# Patient Record
Sex: FEMALE | Race: OTHER | NOT HISPANIC OR LATINO | URBAN - METROPOLITAN AREA
[De-identification: names, ages, dates, MRNs, and addresses within clinical notes are randomized per-mention and may not be internally consistent; named-entity substitution may affect disease eponyms.]

---

## 2022-03-10 ENCOUNTER — HOSPITAL ENCOUNTER (OUTPATIENT)
Facility: CLINIC | Age: 22
Discharge: HOME | End: 2022-03-10
Attending: EMERGENCY MEDICINE
Payer: COMMERCIAL

## 2022-03-10 VITALS
OXYGEN SATURATION: 96 % | BODY MASS INDEX: 21.19 KG/M2 | WEIGHT: 135 LBS | RESPIRATION RATE: 16 BRPM | HEIGHT: 67 IN | DIASTOLIC BLOOD PRESSURE: 78 MMHG | HEART RATE: 103 BPM | TEMPERATURE: 97.5 F | SYSTOLIC BLOOD PRESSURE: 111 MMHG

## 2022-03-10 DIAGNOSIS — J02.9 TONSILLOPHARYNGITIS: Primary | ICD-10-CM

## 2022-03-10 LAB
EXPIRATION DATE: NORMAL
HETEROPH AB SER QL LA: NEGATIVE
Lab: NORMAL
POCT MANUFACTURER: NORMAL
S PYO AG THROAT QL: NEGATIVE

## 2022-03-10 PROCEDURE — 87880 STREP A ASSAY W/OPTIC: CPT | Performed by: EMERGENCY MEDICINE

## 2022-03-10 PROCEDURE — S9083 URGENT CARE CENTER GLOBAL: HCPCS | Performed by: EMERGENCY MEDICINE

## 2022-03-10 PROCEDURE — 86308 HETEROPHILE ANTIBODY SCREEN: CPT | Performed by: EMERGENCY MEDICINE

## 2022-03-10 PROCEDURE — 99213 OFFICE O/P EST LOW 20 MIN: CPT | Performed by: EMERGENCY MEDICINE

## 2022-03-10 RX ORDER — PENICILLIN V POTASSIUM 500 MG/1
500 TABLET, FILM COATED ORAL 2 TIMES DAILY
Qty: 20 TABLET | Refills: 0 | Status: SHIPPED | OUTPATIENT
Start: 2022-03-10 | End: 2022-03-20

## 2022-03-10 RX ORDER — NORGESTIMATE AND ETHINYL ESTRADIOL 0.25-0.035
1 KIT ORAL DAILY
COMMUNITY

## 2022-03-10 ASSESSMENT — ENCOUNTER SYMPTOMS
BACK PAIN: 0
VOICE CHANGE: 0
SHORTNESS OF BREATH: 0
CHILLS: 0
FEVER: 0
ARTHRALGIAS: 0
AGITATION: 0
PALPITATIONS: 0
COLOR CHANGE: 0
NAUSEA: 0
ABDOMINAL PAIN: 0
HEADACHES: 0
COUGH: 0
VOMITING: 0
BRUISES/BLEEDS EASILY: 0
DECREASED CONCENTRATION: 0
SORE THROAT: 1

## 2022-03-10 NOTE — DISCHARGE INSTRUCTIONS
Increase hydration rest  Alternate acetaminophen ibuprofen  Penicillin as directed  Review attached patient care instructions  Follow-up with family physician in the next 10 to 14 days if no improvement or worsening of symptoms

## 2022-03-10 NOTE — ED PROVIDER NOTES
History  Chief Complaint   Patient presents with   • Sore Throat     swollen tonsils, irritation, PND, afebrile symptom onset past Sunday     Patient with sore throat, swollen tonsils for the past 4 to 5 days.  Also complains of swollen lymph nodes in her neck, some malaise.  Denies fever, or been in touch with anyone else with similar illness.  Denies headache, rash, chest pain, trouble swallowing, sinus pressure, earache.  Rapid strep pending.                  Past Medical History:   Diagnosis Date   • Asthma        History reviewed. No pertinent surgical history.    History reviewed. No pertinent family history.    Social History     Tobacco Use   • Smoking status: Never Smoker   • Smokeless tobacco: Never Used   Vaping Use   • Vaping Use: Never used   Substance Use Topics   • Alcohol use: Never   • Drug use: Never       Review of Systems   Constitutional: Negative for chills and fever.   HENT: Positive for sore throat. Negative for congestion, ear pain, nosebleeds and voice change.    Respiratory: Negative for cough and shortness of breath.    Cardiovascular: Negative for chest pain and palpitations.   Gastrointestinal: Negative for abdominal pain, nausea and vomiting.   Musculoskeletal: Negative for arthralgias and back pain.   Skin: Negative for color change and rash.   Allergic/Immunologic: Negative for immunocompromised state.   Neurological: Negative for syncope and headaches.   Hematological: Does not bruise/bleed easily.   Psychiatric/Behavioral: Negative for agitation, behavioral problems and decreased concentration.       Physical Exam  ED Triage Vitals [03/10/22 1418]   Temp Heart Rate Resp BP SpO2   36.4 °C (97.5 °F) (!) 103 16 111/78 96 %      Temp Source Heart Rate Source Patient Position BP Location FiO2 (%) (Set)   Temporal Monitor Lying Right upper arm --       Physical Exam  Vitals and nursing note reviewed.   Constitutional:       General: She is not in acute distress.     Appearance: Normal  appearance. She is well-developed.   HENT:      Head: Normocephalic and atraumatic.      Right Ear: Tympanic membrane normal.      Left Ear: Tympanic membrane normal.      Nose: No congestion.      Mouth/Throat:      Pharynx: Oropharyngeal exudate and posterior oropharyngeal erythema present.      Comments: Bilateral tonsillar exudate, erythema and swelling.  Airway patent.  Eyes:      Conjunctiva/sclera: Conjunctivae normal.   Cardiovascular:      Rate and Rhythm: Normal rate and regular rhythm.   Pulmonary:      Effort: Pulmonary effort is normal. No respiratory distress.      Breath sounds: Normal breath sounds.   Abdominal:      Palpations: Abdomen is soft.      Tenderness: There is no abdominal tenderness.   Musculoskeletal:         General: No swelling or deformity.      Cervical back: Neck supple.   Lymphadenopathy:      Cervical: Cervical adenopathy present.   Skin:     General: Skin is warm and dry.      Coloration: Skin is not jaundiced.      Findings: No rash.   Neurological:      General: No focal deficit present.      Mental Status: She is alert.      Coordination: Coordination normal.      Gait: Gait normal.   Psychiatric:         Behavior: Behavior normal.         Thought Content: Thought content normal.           Procedures  Procedures    UC Course  Clinical Impressions as of 03/10/22 1507   Tonsillopharyngitis       MDM  Number of Diagnoses or Management Options  Tonsillopharyngitis: new and does not require workup  Diagnosis management comments: Mono and rapid strep negative but patient has enough Centor criteria to treat clinically for strep pharyngitis       Amount and/or Complexity of Data Reviewed  Clinical lab tests: ordered and reviewed    Risk of Complications, Morbidity, and/or Mortality  Presenting problems: moderate  Management options: moderate    Patient Progress  Patient progress: stable                 Manuel Cool DO  03/10/22 1512

## 2022-03-10 NOTE — ED NOTES
Bed: 01  Expected date:   Expected time:   Means of arrival: Car  Comments:     Avril Epps MA  03/10/22 2628

## 2022-03-12 ENCOUNTER — HOSPITAL ENCOUNTER (EMERGENCY)
Facility: HOSPITAL | Age: 22
Discharge: HOME | End: 2022-03-12
Attending: EMERGENCY MEDICINE
Payer: COMMERCIAL

## 2022-03-12 VITALS
RESPIRATION RATE: 14 BRPM | OXYGEN SATURATION: 99 % | SYSTOLIC BLOOD PRESSURE: 113 MMHG | WEIGHT: 135 LBS | TEMPERATURE: 98.3 F | BODY MASS INDEX: 21.14 KG/M2 | HEART RATE: 110 BPM | DIASTOLIC BLOOD PRESSURE: 73 MMHG

## 2022-03-12 DIAGNOSIS — J03.90 TONSILLITIS: Primary | ICD-10-CM

## 2022-03-12 LAB
ALBUMIN SERPL-MCNC: 4 G/DL (ref 3.4–5)
ALP SERPL-CCNC: 87 IU/L (ref 35–126)
ALT SERPL-CCNC: 17 IU/L (ref 11–54)
ANION GAP SERPL CALC-SCNC: 11 MEQ/L (ref 3–15)
AST SERPL-CCNC: 20 IU/L (ref 15–41)
BASOPHILS # BLD: 0.04 K/UL (ref 0.01–0.1)
BASOPHILS NFR BLD: 0.3 %
BILIRUB SERPL-MCNC: 0.4 MG/DL (ref 0.3–1.2)
BUN SERPL-MCNC: 7 MG/DL (ref 8–20)
CALCIUM SERPL-MCNC: 9.3 MG/DL (ref 8.9–10.3)
CHLORIDE SERPL-SCNC: 101 MEQ/L (ref 98–109)
CO2 SERPL-SCNC: 23 MEQ/L (ref 22–32)
CREAT SERPL-MCNC: 0.6 MG/DL (ref 0.6–1.1)
DIFFERENTIAL METHOD BLD: ABNORMAL
EOSINOPHIL # BLD: 0.01 K/UL (ref 0.04–0.36)
EOSINOPHIL NFR BLD: 0.1 %
ERYTHROCYTE [DISTWIDTH] IN BLOOD BY AUTOMATED COUNT: 13.2 % (ref 11.7–14.4)
GFR SERPL CREATININE-BSD FRML MDRD: >60 ML/MIN/1.73M*2
GLUCOSE SERPL-MCNC: 85 MG/DL (ref 70–99)
HCG UR QL: NEGATIVE
HCT VFR BLDCO AUTO: 41.9 % (ref 35–45)
HGB BLD-MCNC: 13.3 G/DL (ref 11.8–15.7)
IMM GRANULOCYTES # BLD AUTO: 0.04 K/UL (ref 0–0.08)
IMM GRANULOCYTES NFR BLD AUTO: 0.3 %
LYMPHOCYTES # BLD: 2.58 K/UL (ref 1.2–3.5)
LYMPHOCYTES NFR BLD: 21.9 %
MCH RBC QN AUTO: 26.8 PG (ref 28–33.2)
MCHC RBC AUTO-ENTMCNC: 31.7 G/DL (ref 32.2–35.5)
MCV RBC AUTO: 84.3 FL (ref 83–98)
MONOCYTES # BLD: 0.29 K/UL (ref 0.28–0.8)
MONOCYTES NFR BLD: 2.5 %
NEUTROPHILS # BLD: 8.81 K/UL (ref 1.7–7)
NEUTS SEG NFR BLD: 74.9 %
NRBC BLD-RTO: 0 %
PDW BLD AUTO: 10 FL (ref 9.4–12.3)
PLATELET # BLD AUTO: 294 K/UL (ref 150–369)
POTASSIUM SERPL-SCNC: 3.8 MEQ/L (ref 3.6–5.1)
PROT SERPL-MCNC: 8.4 G/DL (ref 6–8.2)
RBC # BLD AUTO: 4.97 M/UL (ref 3.93–5.22)
S PYO DNA THROAT QL NAA+PROBE: NOT DETECTED
SODIUM SERPL-SCNC: 135 MEQ/L (ref 136–144)
WBC # BLD AUTO: 11.77 K/UL (ref 3.8–10.5)

## 2022-03-12 PROCEDURE — 3E0337Z INTRODUCTION OF ELECTROLYTIC AND WATER BALANCE SUBSTANCE INTO PERIPHERAL VEIN, PERCUTANEOUS APPROACH: ICD-10-PCS | Performed by: EMERGENCY MEDICINE

## 2022-03-12 PROCEDURE — 3E0333Z INTRODUCTION OF ANTI-INFLAMMATORY INTO PERIPHERAL VEIN, PERCUTANEOUS APPROACH: ICD-10-PCS | Performed by: EMERGENCY MEDICINE

## 2022-03-12 PROCEDURE — 86308 HETEROPHILE ANTIBODY SCREEN: CPT | Performed by: PHYSICIAN ASSISTANT

## 2022-03-12 PROCEDURE — 85025 COMPLETE CBC W/AUTO DIFF WBC: CPT | Performed by: PHYSICIAN ASSISTANT

## 2022-03-12 PROCEDURE — 87651 STREP A DNA AMP PROBE: CPT | Performed by: PHYSICIAN ASSISTANT

## 2022-03-12 PROCEDURE — 36415 COLL VENOUS BLD VENIPUNCTURE: CPT | Performed by: PHYSICIAN ASSISTANT

## 2022-03-12 PROCEDURE — 25800000 HC PHARMACY IV SOLUTIONS: Performed by: PHYSICIAN ASSISTANT

## 2022-03-12 PROCEDURE — 63600000 HC DRUGS/DETAIL CODE: Performed by: PHYSICIAN ASSISTANT

## 2022-03-12 PROCEDURE — 96374 THER/PROPH/DIAG INJ IV PUSH: CPT

## 2022-03-12 PROCEDURE — 84703 CHORIONIC GONADOTROPIN ASSAY: CPT | Performed by: PHYSICIAN ASSISTANT

## 2022-03-12 PROCEDURE — 96361 HYDRATE IV INFUSION ADD-ON: CPT

## 2022-03-12 PROCEDURE — 99284 EMERGENCY DEPT VISIT MOD MDM: CPT | Mod: 25

## 2022-03-12 PROCEDURE — 86664 EPSTEIN-BARR NUCLEAR ANTIGEN: CPT | Performed by: PHYSICIAN ASSISTANT

## 2022-03-12 PROCEDURE — 80053 COMPREHEN METABOLIC PANEL: CPT | Performed by: PHYSICIAN ASSISTANT

## 2022-03-12 RX ORDER — DEXAMETHASONE SODIUM PHOSPHATE 4 MG/ML
10 INJECTION, SOLUTION INTRA-ARTICULAR; INTRALESIONAL; INTRAMUSCULAR; INTRAVENOUS; SOFT TISSUE ONCE
Status: COMPLETED | OUTPATIENT
Start: 2022-03-12 | End: 2022-03-12

## 2022-03-12 RX ADMIN — SODIUM CHLORIDE 1000 ML: 9 INJECTION, SOLUTION INTRAVENOUS at 13:00

## 2022-03-12 RX ADMIN — DEXAMETHASONE SODIUM PHOSPHATE 10 MG: 4 INJECTION, SOLUTION INTRA-ARTICULAR; INTRALESIONAL; INTRAMUSCULAR; INTRAVENOUS; SOFT TISSUE at 13:01

## 2022-03-12 ASSESSMENT — ENCOUNTER SYMPTOMS
SHORTNESS OF BREATH: 0
CHEST TIGHTNESS: 0
ABDOMINAL PAIN: 0

## 2022-03-12 NOTE — ED ATTESTATION NOTE
I have personally seen and examined the patient.  I reviewed and agree with physician assistant / nurse practitioner’s assessment and plan of care.     Exam: Patient is uncomfortable but stable in no acute distress.  She is tachycardic but the remainder of her vital signs are normal and she is afebrile.  Examination of the pharynx reveals significant bilateral symmetric erythema and tonsillar exudate.  Uvula is midline and normal in size.  There is significant pharyngeal cellulitis present bilaterally as well.  Neck exam reveals large tender anterior cervical lymph nodes as well as posterior chain adenopathy.  Heart is regular and there is no respiratory distress.  There is no trismus present.  There is no stridor present.  There is no evidence for brawny edema of the floor of the mouth.    Plan: We will treat with IV fluids and Decadron.  Will check labs including rapid strep and mono.  Patient is currently on antibiotics for suspected bacterial pharyngitis.  Patient is currently afebrile.  Concern for possible mono with both anterior and posterior cervical adenopathy           Marcelo Matos DO  03/12/22 1293

## 2022-03-12 NOTE — ED PROVIDER NOTES
Emergency Medicine Note  HPI   HISTORY OF PRESENT ILLNESS     Patient reports symptoms started 6 days ago with sore throat.  She states on Thursday she was seen and rapid strep negative placed empirically on Pen-Vee K.  Patient states Monospot at that time was negative.  Patient states she has had continued sore throat initially started left tonsil now left and right are involved.  She has pain with swallowing but able to swallow her secretions.  Patient states other symptoms continued from one tonsil to both came to the ER for evaluation.  Patient has any chest pain or shortness of breath denies abdominal pain.  Denies any dysuria or abnormal vaginal discharge.            Patient History   PAST HISTORY     Reviewed from Nursing Triage:       Past Medical History:   Diagnosis Date   • Asthma        History reviewed. No pertinent surgical history.    History reviewed. No pertinent family history.    Social History     Tobacco Use   • Smoking status: Never Smoker   • Smokeless tobacco: Never Used   Vaping Use   • Vaping Use: Never used   Substance Use Topics   • Alcohol use: Never   • Drug use: Never         Review of Systems   REVIEW OF SYSTEMS     Review of Systems   Respiratory: Negative for chest tightness and shortness of breath.    Cardiovascular: Negative for chest pain.   Gastrointestinal: Negative for abdominal pain.         VITALS     ED Vitals    Date/Time Temp Pulse Resp BP SpO2 [facility]   03/12/22 1610 -- 110 14 113/73 99 % SS   03/12/22 1230 36.8 °C (98.3 °F) 122 20 111/71 99 % KMP        Pulse Ox %: 99 % (03/12/22 1309)  Pulse Ox Interpretation: Normal (03/12/22 1309)  Heart Rate: 122 (03/12/22 1309)  Rhythm Strip Interpretation: Sinus Tachycardia (03/12/22 1309)     Physical Exam   PHYSICAL EXAM     Physical Exam  Vitals and nursing note reviewed.   Constitutional:       Appearance: She is well-developed.   HENT:      Head: Normocephalic and atraumatic.      Mouth/Throat:      Mouth: Mucous membranes are  moist.      Comments: Bilateral symmetric exudative tonsillitis plus bilateral uvula midline.  No trismus  Eyes:      Conjunctiva/sclera: Conjunctivae normal.   Cardiovascular:      Rate and Rhythm: Tachycardia present.      Heart sounds: No murmur heard.  Pulmonary:      Effort: Pulmonary effort is normal.      Breath sounds: Normal breath sounds. No wheezing or rales.   Abdominal:      General: Bowel sounds are normal.      Palpations: Abdomen is soft.      Tenderness: There is no abdominal tenderness. There is no guarding.   Lymphadenopathy:      Cervical: Cervical adenopathy present.   Neurological:      Mental Status: She is alert and oriented to person, place, and time.   Psychiatric:         Mood and Affect: Mood normal.           PROCEDURES     Procedures     DATA     Results     Procedure Component Value Units Date/Time    Comprehensive metabolic panel [[id number]]  (Abnormal) Collected: 03/12/22 1443    Specimen: Blood, Venous Updated: 03/12/22 1513     Sodium 135 mEQ/L      Potassium 3.8 mEQ/L      Comment: Results obtained on plasma. Plasma Potassium values may be up to 0.4 mEQ/L less than serum values. The differences may be greater for patients with high platelet or white cell counts.        Chloride 101 mEQ/L      CO2 23 mEQ/L      BUN 7 mg/dL      Creatinine 0.6 mg/dL      Glucose 85 mg/dL      Calcium 9.3 mg/dL      AST (SGOT) 20 IU/L      ALT (SGPT) 17 IU/L      Alkaline Phosphatase 87 IU/L      Total Protein 8.4 g/dL      Comment: Test performed on plasma which typically contains approximately 0.4 g/dL more protein than serum.        Albumin 4.0 g/dL      Bilirubin, Total 0.4 mg/dL      eGFR >60.0 mL/min/1.73m*2      Anion Gap 11 mEQ/L     BhCG, Serum, Qual [[id number]]  (Normal) Collected: 03/12/22 1443    Specimen: Blood, Venous Updated: 03/12/22 1503     Preg Test, Serum Negative    CBC and differential [[id number]]  (Abnormal) Collected: 03/12/22 1443    Specimen: Blood, Venous Updated: 03/12/22  1452     WBC 11.77 K/uL      RBC 4.97 M/uL      Hemoglobin 13.3 g/dL      Hematocrit 41.9 %      MCV 84.3 fL      MCH 26.8 pg      MCHC 31.7 g/dL      RDW 13.2 %      Platelets 294 K/uL      MPV 10.0 fL      Differential Type Auto     nRBC 0.0 %      Immature Granulocytes 0.3 %      Neutrophils 74.9 %      Lymphocytes 21.9 %      Monocytes 2.5 %      Eosinophils 0.1 %      Basophils 0.3 %      Immature Granulocytes, Absolute 0.04 K/uL      Neutrophils, Absolute 8.81 K/uL      Lymphocytes, Absolute 2.58 K/uL      Monocytes, Absolute 0.29 K/uL      Eosinophils, Absolute 0.01 K/uL      Basophils, Absolute 0.04 K/uL     Heterophile AB Reflex EBV Evaluation [[id number]] Collected: 03/12/22 1443    Specimen: Blood, Venous Updated: 03/12/22 1447    Group A Strep by PCR, Throat Throat Swab [[id number]]  (Normal) Collected: 03/12/22 1256    Specimen: Throat Swab Updated: 03/12/22 1328     Strep A PCR, Throat Not Detected          Imaging Results    None         No orders to display       Scoring tools                                 ED Course & MDM   MDM / ED COURSE / CLINICAL IMPRESSIONS / DISPO     MDM    ED Course as of 03/12/22 1716   Sat Mar 12, 2022   1711 Patient subjectively states she is feeling better.    I d/w patient regarding her lab work and diagnosis.  Plan DC, strict return instructions given. [JR]      ED Course User Index  [JR] Ford Piedra PA C         Clinical Impressions as of 03/12/22 1716   Tonsillitis     Discharge         Ford Piedra PA C  03/12/22 1716

## 2022-03-14 LAB — HETEROPH AB SER QL LA: NEGATIVE

## 2022-03-15 LAB
EBV NA 1 IGG SER-ACNC: 0.47
EBV VCA IGG SER IA-ACNC: 0.95
EBV VCA IGM SER IA-ACNC: 4.72
INTERPRETATION: ABNORMAL

## 2022-12-20 ENCOUNTER — TRANSCRIBE ORDERS (OUTPATIENT)
Dept: SCHEDULING | Age: 22
End: 2022-12-20

## 2022-12-20 DIAGNOSIS — M79.89 OTHER SPECIFIED SOFT TISSUE DISORDERS: Primary | ICD-10-CM

## 2022-12-27 ENCOUNTER — HOSPITAL ENCOUNTER (OUTPATIENT)
Dept: RADIOLOGY | Age: 22
Discharge: HOME | End: 2022-12-27
Attending: PEDIATRICS
Payer: COMMERCIAL

## 2022-12-27 DIAGNOSIS — M79.89 OTHER SPECIFIED SOFT TISSUE DISORDERS: ICD-10-CM

## 2022-12-27 PROCEDURE — 76882 US LMTD JT/FCL EVL NVASC XTR: CPT | Mod: RT

## 2023-07-02 ENCOUNTER — EMERGENCY (EMERGENCY)
Facility: HOSPITAL | Age: 23
LOS: 1 days | Discharge: HOME | End: 2023-07-02
Attending: EMERGENCY MEDICINE | Admitting: EMERGENCY MEDICINE
Payer: COMMERCIAL

## 2023-07-02 VITALS
SYSTOLIC BLOOD PRESSURE: 119 MMHG | HEIGHT: 66 IN | WEIGHT: 130.07 LBS | HEART RATE: 78 BPM | DIASTOLIC BLOOD PRESSURE: 82 MMHG | OXYGEN SATURATION: 98 % | RESPIRATION RATE: 16 BRPM | TEMPERATURE: 99 F

## 2023-07-02 VITALS
TEMPERATURE: 98 F | RESPIRATION RATE: 18 BRPM | DIASTOLIC BLOOD PRESSURE: 75 MMHG | HEART RATE: 70 BPM | SYSTOLIC BLOOD PRESSURE: 115 MMHG | OXYGEN SATURATION: 99 %

## 2023-07-02 PROCEDURE — 99283 EMERGENCY DEPT VISIT LOW MDM: CPT

## 2023-07-02 RX ORDER — EPINEPHRINE 0.3 MG/.3ML
0.3 INJECTION INTRAMUSCULAR; SUBCUTANEOUS
Qty: 1 | Refills: 0
Start: 2023-07-02

## 2023-07-02 RX ORDER — ONDANSETRON 8 MG/1
4 TABLET, FILM COATED ORAL ONCE
Refills: 0 | Status: COMPLETED | OUTPATIENT
Start: 2023-07-02 | End: 2023-07-02

## 2023-07-02 RX ORDER — FAMOTIDINE 10 MG/ML
20 INJECTION INTRAVENOUS ONCE
Refills: 0 | Status: COMPLETED | OUTPATIENT
Start: 2023-07-02 | End: 2023-07-02

## 2023-07-02 RX ORDER — DEXAMETHASONE 0.5 MG/5ML
10 ELIXIR ORAL ONCE
Refills: 0 | Status: COMPLETED | OUTPATIENT
Start: 2023-07-02 | End: 2023-07-02

## 2023-07-02 RX ADMIN — Medication 10 MILLIGRAM(S): at 21:53

## 2023-07-02 RX ADMIN — ONDANSETRON 4 MILLIGRAM(S): 8 TABLET, FILM COATED ORAL at 21:55

## 2023-07-02 RX ADMIN — FAMOTIDINE 20 MILLIGRAM(S): 10 INJECTION INTRAVENOUS at 21:53

## 2023-07-02 NOTE — ED PROVIDER NOTE - CLINICAL SUMMARY MEDICAL DECISION MAKING FREE TEXT BOX
Patient anaphylaxis at this time.  Will give Decadron Pepcid and prescribe an EpiPen at a 24-hour pharmacy.

## 2023-07-02 NOTE — ED ADULT NURSE NOTE - CHPI ED NUR SYMPTOMS NEG
no congestion/no difficulty breathing/no difficulty swallowing/no rash/no shortness of breath/no swelling of face, tongue/no throat itching/no vomiting

## 2023-07-02 NOTE — ED PROVIDER NOTE - PROGRESS NOTE DETAILS
22-year-old female presented to emergency department for allergic reaction.  Patient feels improved with medication no sign of anaphylaxis DC home with prescription for EpiPen and strict return precautions.

## 2023-07-02 NOTE — ED PROVIDER NOTE - ATTENDING CONTRIBUTION TO CARE
22-year-old female with past medical history of asthma and allergies to cashews and peanuts presents emergency department for allergic reaction.  Patient went to family would not had a vegan ice cream which she did not know had cashews in it and afterwards she started developing some throat tightness and scratchy throat. no vomiting no rash.  Mom called 911 EMS arrived and gave her 50 mg of Benadryl IM and brought her to the emergency department.  Patient states she feels improved now is not having any shortness of breath no rash no change in voice no stridor.  Patient and her mother from out of town and [place] [facility] [place] but do not [place]Pen with them.    Const: NAD  Eyes: PERRL, no conjunctival injection  HENT:  Neck supple without meningismus, Midline nonswollen uvula no stridor no drooling   CV: RRR, Warm, well-perfused extremities  RESP: CTA B/L, no tachypnea   GI: soft, non-tender, non-distended  MSK: No gross deformities appreciated  Skin: Warm, dry. No rashes  Neuro: Alert, CNs II-XII grossly intact. Sensation and motor function of extremities grossly intact.  Psych: Appropriate mood and affect.    Patient anaphylaxis at this time.  Will give Decadron Pepcid and prescribe an EpiPen at a 24-hour pharmacy.

## 2023-07-02 NOTE — ED PROVIDER NOTE - NSFOLLOWUPINSTRUCTIONS_ED_ALL_ED_FT
English    How to Use an Auto-Injector Pen  An auto-injector pen (pre-filled automatic epinephrine injection device) is a device that is used to deliver epinephrine to the body. Epinephrine is a medicine that is given as a shot (injection). It works by relaxing the muscles in the airways and tightening the blood vessels. It is used to treat:  A life-threatening allergic reaction (anaphylaxis).  Serious breathing problems, such as severe asthma attacks, some lung problems, and other emergency conditions.  An epinephrine injection can save your life. You should always carry an auto-injector pen with you if you are at risk for severe asthma attacks or anaphylaxis. You may hear other names for an auto-injector pen. They are epinephrine injection, epinephrine auto-injector pen, epinephrine pen, and automatic injection device.    What are the risks?  Using the auto-injector pen is safe. However, problems may arise, including:  Damage to bone or tissue. Make sure that you correctly place the needle in the muscle of your outer thigh as told by your health care provider.  When should I use my auto-injector pen?  Use your auto-injector pen as soon as you think you are experiencing anaphylaxis or a severe asthma attack. Anaphylaxis is very dangerous if it is not treated right away.    Signs and symptoms of anaphylaxis may include:  Feeling warm in the face (flushed). This may include redness.  Itchy, red, swollen areas of skin (hives).  Swelling of the eyes, lips, face, mouth, tongue, or throat.  Difficulty breathing, speaking, or swallowing.  Noisy breathing (wheezing).  Dizziness, light-headedness, or fainting.  Pain or cramping in the abdomen.  Vomiting or diarrhea.  These symptoms may represent a serious problem that is an emergency. Do not wait to see if the symptoms will go away. Use your auto-injector pen as you have been instructed, and get medical help right away. Call your local emergency services (911 in the [place] Do not drive yourself to the hospital.    General tips for using an auto-injector pen  A person using an epinephrine auto-injector in the thigh.  Use epinephrine exactly as told by your health care provider. Do not inject it more often or in greater or smaller doses than your health care provider told you. Most auto-injector pens contain one dose of epinephrine. Some contain two doses.  Sit or lie down before giving yourself an injection or before receiving an injection from someone else.  Use the auto-injector pen to give yourself an injection under your skin or into your muscle on the outer side of your thigh. Do not give yourself an injection into your buttocks or any other part of your body.  In an emergency, you can use your auto-injector pen through your clothing.  After you inject a dose of epinephrine, some liquid may remain in your auto-injector pen. This is normal.  Replace your epinephrine immediately after you use your auto-injector pen. This is important if you have another reaction. If possible, carry two epinephrine auto-injector pens.  If you need to give yourself a second dose of epinephrine, give the second injection in another area on your outer thigh. Do not give two injections in exactly the same place on your body. This can lead to tissue damage.  From time to time:  Check the expiration date on your auto-injector pen.  Check the solution to ensure that it is not cloudy and that there are no particles floating in it. If your auto-injector pen is  or if the solution is cloudy or has particles floating in it, throw it away and get a new one.  Ask your health care provider how to safely get rid of used or  auto-injector pens.  Talk with your pharmacist or health care provider if you have questions about how to inject epinephrine correctly.  Get help right away if:  You inject epinephrine. If you use epinephrine, you must still get emergency medical treatment, even if the medicine seems to be working.  You may need additional medical care, and you may need to be monitored for the side effects of epinephrine. The side effects include:  Fast or irregular heartbeat.  Nervousness or anxiety with shaking that does not stop.  Difficulty breathing.  Sweating.  Headache.  Nausea or vomiting.  Dizziness or weakness.  Summary  An auto-injector pen (pre-filled automatic epinephrine injection device) is a device that is used to deliver epinephrine to the body.  An auto-injector pen is used to treat a life-threatening allergic reaction (anaphylaxis), asthma attack, or other emergency conditions.  You should always carry an auto-injector pen with you if you are at risk for anaphylaxis or severe asthma attacks.  Use of this device is safe. However, bone or tissue damage can occur if you do not follow instructions for injecting the medicine.  Talk with your pharmacist or health care provider if you have questions about how to inject epinephrine correctly.  This information is not intended to replace advice given to you by your health care provider. Make sure you discuss any questions you have with your health care provider.    Document Revised: 2023 Document Reviewed: 2022  Elsevier Patient Education ©  [employer]

## 2023-07-02 NOTE — ED PROVIDER NOTE - PATIENT PORTAL LINK FT
You can access the FollowMyHealth Patient Portal offered by [facility] by registering at the following website: [email or web address]. By joining [employer]’s FollowMyHealth portal, you will also be able to view your health information using other applications (apps) compatible with our system.

## 2023-07-02 NOTE — ED PROVIDER NOTE - PHYSICAL EXAMINATION
Const: NAD  Eyes: PERRL, no conjunctival injection  HENT:  Neck supple without meningismus, Midline nonswollen uvula no stridor no drooling   CV: RRR, Warm, well-perfused extremities  RESP: CTA B/L, no tachypnea   GI: soft, non-tender, non-distended  MSK: No gross deformities appreciated  Skin: Warm, dry. No rashes  Neuro: Alert, CNs II-XII grossly intact. Sensation and motor function of extremities grossly intact.  Psych: Appropriate mood and affect.

## 2023-07-02 NOTE — ED ADULT TRIAGE NOTE - CHIEF COMPLAINT QUOTE
Pt brought in by EMS for complaint of an allergic reaction after tasting ice cream that may have had cashews. Pt given 50mg intramuscular benadryl by EMS for throat tightness and nausea. PT reports improvement in throat tightness and now complains of mild nausea and lightheadedness. Denies any difficulty breathing, SOB, itching, rash or hives.

## 2023-07-02 NOTE — ED PROVIDER NOTE - OBJECTIVE STATEMENT
22-year-old female with past medical history of asthma and allergies to cashews and peanuts presents emergency department for allergic reaction.  Patient went to family would not had a vegan ice cream which she did not know had cashews in it and afterwards she started developing some throat tightness and scratchy throat. no vomiting no rash.  Mom called 911 EMS arrived and gave her 50 mg of Benadryl IM and brought her to the emergency department.  Patient states she feels improved now is not having any shortness of breath no rash no change in voice no stridor.  Patient and her mother from out of town and [facility] [place] [facility] but do not have EpiPen with them.

## 2023-07-05 DIAGNOSIS — T78.05XA ANAPHYLACTIC REACTION DUE TO TREE NUTS AND SEEDS, INITIAL ENCOUNTER: ICD-10-CM

## 2023-07-05 DIAGNOSIS — R07.0 PAIN IN THROAT: ICD-10-CM

## 2023-07-05 DIAGNOSIS — Z87.09 PERSONAL HISTORY OF OTHER DISEASES OF THE RESPIRATORY SYSTEM: ICD-10-CM

## 2023-07-05 DIAGNOSIS — Z91.010 ALLERGY TO PEANUTS: ICD-10-CM

## 2023-07-05 DIAGNOSIS — Z91.018 ALLERGY TO OTHER FOODS: ICD-10-CM

## 2024-12-10 ENCOUNTER — TELEPHONE (OUTPATIENT)
Dept: SURGERY | Facility: CLINIC | Age: 24
End: 2024-12-10
Payer: COMMERCIAL

## 2024-12-10 NOTE — TELEPHONE ENCOUNTER
"[facility] Appointment Request   Provider: Dr Gabriel  Appointment Type: New Patient   Reason for Visit: pt requesting consult for biopsy of R breast, has a mass. Breast US @ [facility] on [address] in [place]: \"BI-RADS 3 - Probably benign\", but pt wants to confirm this w/ bx.  Available Day and Time: either [facility] or   Best Contact Number: [phone]    The practice will reach out to schedule your appointment within the next 2 business days.   "

## 2024-12-11 NOTE — TELEPHONE ENCOUNTER
Department: [facility] BRST SURG    Clinical Pool: BREAST SURG [facility] CLINICAL SUPPORT P   PSR Pool: BREAST SURG [facility] PSR P                               Patient called to check status on getting an appt. Explained this request is being worked on and patient will get a call back from the Dr's office.